# Patient Record
(demographics unavailable — no encounter records)

---

## 2025-02-18 NOTE — HISTORY OF PRESENT ILLNESS
[FreeTextEntry1] : Mohs surgery for a nodular BCC on the left chin [de-identified] : 02/18/2025   Referred by: Dr. Quinn  Mr. TRAMAINE HARRIS is a 86 year old M who presents for Mohs surgery for a nodular BCC on the left chin. Consult done 1/8/2025 He also had 2 other biopsies by Dr. 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 He was prescribed Efudex by Dr. Quinn for the other spots and referred for Mohs for the left chin.  He has had Mohs surgery with Dr. Argueta in the past.   Social History: Lives in [place] and is seen for consult with his wife, Renuka. He is retired from the fur coat industry.  Has 2 cats - one with ADHD.  Renuka currently works in [employer] and [employer] for [employer] in [employer] overseeing the program. She is familiar with the [facility] director in [place].   Blood thinners: None Allergies: PCN  Former tobacco

## 2025-02-18 NOTE — HISTORY OF PRESENT ILLNESS
[FreeTextEntry1] : Mohs surgery for a nodular BCC on the left chin [de-identified] : 02/18/2025   Referred by: Dr. Quinn  Mr. TRAMAINE HARRIS is a 86 year old M who presents for Mohs surgery for a nodular BCC on the left chin. Consult done 1/8/2025 He also had 2 other biopsies by Dr. 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 He was prescribed Efudex by Dr. Quinn for the other spots and referred for Mohs for the left chin.  He has had Mohs surgery with Dr. Argueta in the past.   Social History: Lives in [place] and is seen for consult with his wife, Renuka. He is retired from the fur coat industry.  Has 2 cats - one with ADHD.  Renuka currently works in [employer] and [employer] for [employer] in [employer] overseeing the program. She is familiar with the [employer] director in [place].   Blood thinners: None Allergies: PCN  Former tobacco

## 2025-02-18 NOTE — PHYSICAL EXAM
[Alert] : alert [Oriented x 3] : ~L oriented x 3 [Well Nourished] : well nourished [Conjunctiva Non-injected] : conjunctiva non-injected [No Visual Lymphadenopathy] : no visual  lymphadenopathy [No Clubbing] : no clubbing [No Edema] : no edema [No Bromhidrosis] : no bromhidrosis [No Chromhidrosis] : no chromhidrosis [FreeTextEntry3] : -- Left chin with an ill-defined at least 0.9x0.9 cm pearly plaque